# Patient Record
Sex: MALE | Race: WHITE | HISPANIC OR LATINO | URBAN - METROPOLITAN AREA
[De-identification: names, ages, dates, MRNs, and addresses within clinical notes are randomized per-mention and may not be internally consistent; named-entity substitution may affect disease eponyms.]

---
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
## 2023-11-08 ENCOUNTER — COMPREHENSIVE EXAM (OUTPATIENT)
Dept: URBAN - METROPOLITAN AREA CLINIC 31 | Facility: CLINIC | Age: 18
End: 2023-11-08

## 2023-11-08 DIAGNOSIS — H52.223: ICD-10-CM

## 2023-11-08 PROCEDURE — 92015 DETERMINE REFRACTIVE STATE: CPT

## 2023-11-08 PROCEDURE — 92014 COMPRE OPH EXAM EST PT 1/>: CPT

## 2023-11-08 ASSESSMENT — VISUAL ACUITY
OS_PH: 20/30
OD_PH: 20/30
OD_SC: J1
OS_SC: 20/40
OS_SC: J1
OD_SC: 20/50

## 2023-11-08 ASSESSMENT — TONOMETRY
OD_IOP_MMHG: 12
OS_IOP_MMHG: 12

## 2025-01-13 ENCOUNTER — COMPREHENSIVE EXAM (OUTPATIENT)
Age: 20
End: 2025-01-13

## 2025-01-13 DIAGNOSIS — H52.223: ICD-10-CM

## 2025-01-13 PROCEDURE — 92014 COMPRE OPH EXAM EST PT 1/>: CPT

## 2025-01-13 PROCEDURE — 92015 DETERMINE REFRACTIVE STATE: CPT

## 2025-01-13 PROCEDURE — 92250 FUNDUS PHOTOGRAPHY W/I&R: CPT

## 2025-03-21 NOTE — PATIENT DISCUSSION
NOT A CANDIDATE FOR RESTOR LENS, WOULD NEED SYMFONY LENS DUE TO MACULA CHANGES. patient no fever and no chills.